# Patient Record
Sex: FEMALE | URBAN - METROPOLITAN AREA
[De-identification: names, ages, dates, MRNs, and addresses within clinical notes are randomized per-mention and may not be internally consistent; named-entity substitution may affect disease eponyms.]

---

## 2021-04-21 ENCOUNTER — OFFICE VISIT (OUTPATIENT)
Dept: URBAN - METROPOLITAN AREA CLINIC 53 | Facility: CLINIC | Age: 71
End: 2021-04-21
Payer: MEDICARE

## 2021-04-21 DIAGNOSIS — H43.823 VITREOMACULAR TRACTION OF BILATERAL EYE: ICD-10-CM

## 2021-04-21 DIAGNOSIS — H25.13 AGE-RELATED NUCLEAR CATARACT, BILATERAL: ICD-10-CM

## 2021-04-21 PROCEDURE — 99204 OFFICE O/P NEW MOD 45 MIN: CPT | Performed by: OPHTHALMOLOGY

## 2021-04-21 PROCEDURE — 92134 CPTRZ OPH DX IMG PST SGM RTA: CPT | Performed by: OPHTHALMOLOGY

## 2021-04-21 ASSESSMENT — INTRAOCULAR PRESSURE
OS: 17
OD: 17

## 2021-04-21 NOTE — IMPRESSION/PLAN
Impression: Macular hole of right eye: H35.341. Plan: She complains of poor vision OD x 1 month. Your notes were reviewed. There is a symptomatic, full-thickness macular hole (MH). OCT shows VMT/FTMH OD and VMT/trace edema OS. We discussed the natural history, and the risks and benefits of observation versus vitrectomy were explained. Macular holes can often, but not always, close with vitrectomy. If the hole is successfully closed, the vision usually improves; however the vision does not typically return to normal. The risk of surgery include, but are not limited to, infection, retinal tear and detachment, glaucoma, cataract formation in a phakic eye, hemorrhage, recurrent macular hole, CME, need for further surgery, loss of eye, and blindness. The patient understands that they cannot fly or travel to high altitudes until the gas bubble dissipates, otherwise they risk increased intraocular pressure, pain, and even blindness. The patient understand that face down positioning is critical for a successful outcome. The patient elects vitrectomy surgery. thanks [employer] PLAN: 25G PPV, MP, ICG, ILM PEEL, GAS- OD

## 2021-04-21 NOTE — IMPRESSION/PLAN
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 Plan: The patient has vitreomacular traction that is causing cystoid macular edema O. These findings were confirmed with OCT and FA today. About 50% of these cases will spontaneously resolve as the vitreous separtes from the retina. Occasionally the traction will progress to a full thickness macular hole. Recommend observation at this time.  May consider vitrectomy in the future if no improvement O.

## 2021-05-18 ENCOUNTER — SURGERY (OUTPATIENT)
Dept: URBAN - METROPOLITAN AREA EXTERNAL CLINIC 26 | Facility: EXTERNAL CLINIC | Age: 71
End: 2021-05-18
Payer: MEDICARE

## 2021-05-18 PROCEDURE — 67042 VIT FOR MACULAR HOLE: CPT | Performed by: OPHTHALMOLOGY

## 2021-05-19 ENCOUNTER — POST-OPERATIVE VISIT (OUTPATIENT)
Dept: URBAN - METROPOLITAN AREA CLINIC 53 | Facility: CLINIC | Age: 71
End: 2021-05-19
Payer: MEDICARE

## 2021-05-19 PROCEDURE — 99024 POSTOP FOLLOW-UP VISIT: CPT | Performed by: OPHTHALMOLOGY

## 2021-05-19 ASSESSMENT — INTRAOCULAR PRESSURE: OD: 21

## 2021-05-19 NOTE — IMPRESSION/PLAN
Impression: S/P 25G PPV, MP, ICG, ILM PEEL, GAS OD - 1 Day. Macular hole of right eye  H35.341. Plan: doing well. alt prec FDP
gtts
shield RTC 1 week POS --Continue Prednisolone acetate 1% and Continue Ocuflox QID OD

## 2021-05-26 ENCOUNTER — POST-OPERATIVE VISIT (OUTPATIENT)
Dept: URBAN - METROPOLITAN AREA CLINIC 53 | Facility: CLINIC | Age: 71
End: 2021-05-26
Payer: MEDICARE

## 2021-05-26 PROCEDURE — 99024 POSTOP FOLLOW-UP VISIT: CPT | Performed by: OPHTHALMOLOGY

## 2021-05-26 ASSESSMENT — INTRAOCULAR PRESSURE
OS: 15
OD: 16

## 2021-05-26 NOTE — IMPRESSION/PLAN
Impression: S/P 25G PPV, MP, ICG, ILM PEEL, GAS OD - 8 Days. Macular hole of right eye  H35.341. Plan: Avoid supine
taper off PF Alt. Prec.  
RTC  4-6 weeks OCT OD POS --Taper Prednisolone acetate 1% TID x 2 days, BID x 2 days, QD x 2 days, then d/c--Discontinue Ocuflox

## 2021-06-23 ENCOUNTER — POST-OPERATIVE VISIT (OUTPATIENT)
Dept: URBAN - METROPOLITAN AREA CLINIC 53 | Facility: CLINIC | Age: 71
End: 2021-06-23
Payer: MEDICARE

## 2021-06-23 DIAGNOSIS — H35.341 MACULAR HOLE OF RIGHT EYE: Primary | ICD-10-CM

## 2021-06-23 PROCEDURE — 92134 CPTRZ OPH DX IMG PST SGM RTA: CPT | Performed by: OPHTHALMOLOGY

## 2021-06-23 ASSESSMENT — INTRAOCULAR PRESSURE
OD: 9
OS: 15
OS: 32

## 2021-06-23 NOTE — IMPRESSION/PLAN
Impression: S/P 25G PPV, MP, ICG, ILM PEEL, GAS OD - 36 Days. Macular hole of right eye  H35.341. Plan: doing well with closed hole on exam and OCT today. She's happy with her vision improvement. She will fu with a cataract surgeon and then with your excellent care. thanks [employer] RTC PRN

## 2023-07-05 ENCOUNTER — OFFICE VISIT (OUTPATIENT)
Dept: URBAN - METROPOLITAN AREA CLINIC 54 | Facility: CLINIC | Age: 73
End: 2023-07-05
Payer: MEDICARE

## 2023-07-05 DIAGNOSIS — H35.343 MACULAR CYST, HOLE, OR PSEUDOHOLE, BILATERAL: Primary | ICD-10-CM

## 2023-07-05 DIAGNOSIS — H43.822 VITREOMACULAR TRACTION OF LEFT EYE: ICD-10-CM

## 2023-07-05 PROCEDURE — 92134 CPTRZ OPH DX IMG PST SGM RTA: CPT | Performed by: OPHTHALMOLOGY

## 2023-07-05 PROCEDURE — 99214 OFFICE O/P EST MOD 30 MIN: CPT | Performed by: OPHTHALMOLOGY

## 2023-07-05 RX ORDER — PREDNISOLONE ACETATE 10 MG/ML
1 % SUSPENSION/ DROPS OPHTHALMIC
Qty: 5 | Refills: 1 | Status: INACTIVE
Start: 2023-07-05 | End: 2023-10-02

## 2023-07-05 RX ORDER — MOXIFLOXACIN 5 MG/ML
0.5 % SOLUTION/ DROPS OPHTHALMIC
Qty: 5 | Refills: 1 | Status: INACTIVE
Start: 2023-07-05 | End: 2023-10-02

## 2023-07-05 ASSESSMENT — INTRAOCULAR PRESSURE
OD: 18
OS: 18

## 2023-07-05 NOTE — IMPRESSION/PLAN
Impression: Macular cyst, hole, or pseudohole, bilateral: H35.343.
s/p successful macular hole surgery OD. Plan: There is a symptomatic, full-thickness macular hole (MH). OCT shows s/p MHS with closed hole OD and VMT/FTMH OS. We discussed the natural history, and the risks and benefits of observation versus vitrectomy were explained. Macular holes can often, but not always, close with vitrectomy. If the hole is successfully closed, the vision usually improves; however the vision does not typically return to normal. The risk of surgery include, but are not limited to, infection, retinal tear and detachment, glaucoma, cataract formation in a phakic eye, hemorrhage, recurrent macular hole, CME, need for further surgery, loss of eye, and blindness. The patient understands that they cannot fly or travel to high altitudes until the gas bubble dissipates, otherwise they risk increased intraocular pressure, pain, and even blindness. The patient understand that face down positioning is critical for a successful outcome. The patient elects vitrectomy surgery. thanks Dr. Fab Winter PLAN: 25G PPV/MP/ICG/ILM PEEL/GAS OS

## 2023-07-13 ENCOUNTER — POST-OPERATIVE VISIT (OUTPATIENT)
Dept: URBAN - METROPOLITAN AREA CLINIC 54 | Facility: CLINIC | Age: 73
End: 2023-07-13
Payer: MEDICARE

## 2023-07-13 DIAGNOSIS — H35.343 MACULAR CYST, HOLE, OR PSEUDOHOLE, BILATERAL: Primary | ICD-10-CM

## 2023-07-13 PROCEDURE — 99024 POSTOP FOLLOW-UP VISIT: CPT | Performed by: OPHTHALMOLOGY

## 2023-07-13 ASSESSMENT — INTRAOCULAR PRESSURE
OS: 19
OD: 19

## 2023-07-13 NOTE — IMPRESSION/PLAN
Impression: S/P 25G PPV/MP/ICG/ILM PEEL/GAS OS - 1 Day. Macular cyst, hole, or pseudohole, bilateral  H35.343. Plan: PF/Oflox QID OS. Gas precautions Sleep on right side, FDP 45 mins/hour x 5 days RTC 1 week POS DFE OS

## 2023-07-19 ENCOUNTER — POST-OPERATIVE VISIT (OUTPATIENT)
Dept: URBAN - METROPOLITAN AREA CLINIC 54 | Facility: CLINIC | Age: 73
End: 2023-07-19
Payer: MEDICARE

## 2023-07-19 DIAGNOSIS — H35.343 MACULAR CYST, HOLE, OR PSEUDOHOLE, BILATERAL: Primary | ICD-10-CM

## 2023-07-19 PROCEDURE — 99024 POSTOP FOLLOW-UP VISIT: CPT | Performed by: OPHTHALMOLOGY

## 2023-07-19 ASSESSMENT — INTRAOCULAR PRESSURE
OD: 17
OS: 17

## 2023-07-19 NOTE — IMPRESSION/PLAN
Impression: S/P 25G PPV/MP/ICG/ILM PEEL/GAS OS - 7 Days. Macular cyst, hole, or pseudohole, bilateral  H35.343. Plan: Avoid Supine Alt.  Prec. 
taper off PF
RTC 2-3 months OCT OU

## 2023-10-18 ENCOUNTER — OFFICE VISIT (OUTPATIENT)
Dept: URBAN - METROPOLITAN AREA CLINIC 54 | Facility: CLINIC | Age: 73
End: 2023-10-18
Payer: MEDICARE

## 2023-10-18 DIAGNOSIS — H35.343 MACULAR CYST, HOLE, OR PSEUDOHOLE, BILATERAL: Primary | ICD-10-CM

## 2023-10-18 PROCEDURE — 99213 OFFICE O/P EST LOW 20 MIN: CPT | Performed by: OPHTHALMOLOGY

## 2023-10-18 PROCEDURE — 92134 CPTRZ OPH DX IMG PST SGM RTA: CPT | Performed by: OPHTHALMOLOGY

## 2023-10-18 ASSESSMENT — INTRAOCULAR PRESSURE
OS: 21
OD: 17